# Patient Record
Sex: FEMALE | Race: WHITE
[De-identification: names, ages, dates, MRNs, and addresses within clinical notes are randomized per-mention and may not be internally consistent; named-entity substitution may affect disease eponyms.]

---

## 2017-01-01 ENCOUNTER — PRIOR ORIGINAL RECORDS (OUTPATIENT)
Dept: OTHER | Age: 82
End: 2017-01-01

## 2017-01-01 LAB
ALT (SGPT): 14 U/L
AST (SGOT): 21 U/L
BNP: 400 PMOL/L
BNP: 427 PMOL/L
BUN: 15 MG/DL
BUN: 21 MG/DL
BUN: 29 MG/DL
BUN: 32 MG/DL
CALCIUM: 8.7 MG/DL
CALCIUM: 8.9 MG/DL
CALCIUM: 8.9 MG/DL
CALCIUM: 9.1 MG/DL
CHLORIDE: 103 MEQ/L
CHLORIDE: 103 MEQ/L
CHLORIDE: 105 MEQ/L
CHLORIDE: 106 MEQ/L
CHOLESTEROL, TOTAL: 143 MG/DL
CREATININE, SERUM: 1.17 MG/DL
CREATININE, SERUM: 1.19 MG/DL
CREATININE, SERUM: 1.47 MG/DL
CREATININE, SERUM: 1.87 MG/DL
GLUCOSE: 108 MG/DL
GLUCOSE: 114 MG/DL
GLUCOSE: 115 MG/DL
GLUCOSE: 143 MG/DL
HDL CHOLESTEROL: 50 MG/DL
LDL CHOLESTEROL: 63 MG/DL
MAGNESIUM: 2 MG/DL
MAGNESIUM: 2 MG/DL
NON-HDL CHOLESTEROL: 93 MG/DL
POTASSIUM, SERUM: 3.3 MEQ/L
POTASSIUM, SERUM: 3.5 MEQ/L
POTASSIUM, SERUM: 3.6 MEQ/L
POTASSIUM, SERUM: 4 MEQ/L
SODIUM: 137 MEQ/L
SODIUM: 140 MEQ/L
TRIGLYCERIDES: 149 MG/DL

## 2017-04-19 ENCOUNTER — LAB ENCOUNTER (OUTPATIENT)
Dept: LAB | Age: 82
End: 2017-04-19
Attending: INTERNAL MEDICINE
Payer: MEDICARE

## 2017-04-19 DIAGNOSIS — I50.9 CHF (CONGESTIVE HEART FAILURE) (HCC): ICD-10-CM

## 2017-04-19 DIAGNOSIS — I25.10 CAD (CORONARY ARTERY DISEASE): Primary | ICD-10-CM

## 2017-04-19 PROCEDURE — 36415 COLL VENOUS BLD VENIPUNCTURE: CPT

## 2017-04-19 PROCEDURE — 80061 LIPID PANEL: CPT

## 2017-04-19 PROCEDURE — 84460 ALANINE AMINO (ALT) (SGPT): CPT

## 2017-04-19 PROCEDURE — 84450 TRANSFERASE (AST) (SGOT): CPT

## 2017-07-08 ENCOUNTER — HOSPITAL ENCOUNTER (OUTPATIENT)
Age: 82
Discharge: HOME | End: 2017-07-08
Attending: EMERGENCY MEDICINE
Payer: MEDICARE

## 2017-07-08 VITALS
RESPIRATION RATE: 16 BRPM | DIASTOLIC BLOOD PRESSURE: 57 MMHG | BODY MASS INDEX: 29.99 KG/M2 | WEIGHT: 180 LBS | HEIGHT: 65 IN | HEART RATE: 92 BPM | SYSTOLIC BLOOD PRESSURE: 140 MMHG | TEMPERATURE: 98 F | OXYGEN SATURATION: 95 %

## 2017-07-08 DIAGNOSIS — S61.209A AVULSION, FINGER TIP, INITIAL ENCOUNTER: Primary | ICD-10-CM

## 2017-07-08 PROCEDURE — 99213 OFFICE O/P EST LOW 20 MIN: CPT

## 2017-07-08 PROCEDURE — 12001 RPR S/N/AX/GEN/TRNK 2.5CM/<: CPT

## 2017-09-15 ENCOUNTER — TELEPHONE (OUTPATIENT)
Dept: PULMONOLOGY | Facility: CLINIC | Age: 82
End: 2017-09-15

## 2017-09-15 NOTE — TELEPHONE ENCOUNTER
Spoke with pt she is requesting appt to see [facility], she is not in acute distress.  Pt reports episodes of SOB with activity for a prolonged period, pt declines any immediate attention necessary, pt offered appt 9/21 @ 11 am, pt accepted, location information p

## 2017-09-20 ENCOUNTER — APPOINTMENT (OUTPATIENT)
Dept: GENERAL RADIOLOGY | Facility: HOSPITAL | Age: 82
DRG: 246 | End: 2017-09-20
Attending: EMERGENCY MEDICINE
Payer: MEDICARE

## 2017-09-20 ENCOUNTER — HOSPITAL ENCOUNTER (INPATIENT)
Facility: HOSPITAL | Age: 82
LOS: 11 days | Discharge: HOME HEALTH | DRG: 246 | End: 2017-10-01
Attending: EMERGENCY MEDICINE | Admitting: INTERNAL MEDICINE
Payer: MEDICARE

## 2017-09-20 DIAGNOSIS — I50.20 SYSTOLIC HEART FAILURE, UNSPECIFIED HEART FAILURE CHRONICITY: Primary | ICD-10-CM

## 2017-09-20 PROBLEM — I50.9 HEART FAILURE (HCC): Status: ACTIVE | Noted: 2017-09-20

## 2017-09-20 PROCEDURE — 71010 XR CHEST AP PORTABLE  (CPT=71010): CPT | Performed by: EMERGENCY MEDICINE

## 2017-09-20 PROCEDURE — 99223 1ST HOSP IP/OBS HIGH 75: CPT | Performed by: HOSPITALIST

## 2017-09-20 RX ORDER — PENTOXIFYLLINE 400 MG/1
400 TABLET, EXTENDED RELEASE ORAL 2 TIMES DAILY
COMMUNITY

## 2017-09-20 RX ORDER — ZOLPIDEM TARTRATE 5 MG/1
5 TABLET ORAL NIGHTLY PRN
Status: DISCONTINUED | OUTPATIENT
Start: 2017-09-20 | End: 2017-10-01

## 2017-09-20 RX ORDER — AMLODIPINE BESYLATE 5 MG/1
5 TABLET ORAL NIGHTLY
Status: DISCONTINUED | OUTPATIENT
Start: 2017-09-20 | End: 2017-09-21

## 2017-09-20 RX ORDER — SODIUM PHOSPHATE, DIBASIC AND SODIUM PHOSPHATE, MONOBASIC 7; 19 G/133ML; G/133ML
1 ENEMA RECTAL ONCE AS NEEDED
Status: DISCONTINUED | OUTPATIENT
Start: 2017-09-20 | End: 2017-10-01

## 2017-09-20 RX ORDER — DILTIAZEM HYDROCHLORIDE 5 MG/ML
20 INJECTION INTRAVENOUS ONCE
Status: COMPLETED | OUTPATIENT
Start: 2017-09-20 | End: 2017-09-20

## 2017-09-20 RX ORDER — ERGOCALCIFEROL 1.25 MG/1
50000 CAPSULE ORAL
COMMUNITY

## 2017-09-20 RX ORDER — ASPIRIN 81 MG/1
81 TABLET ORAL DAILY
Status: DISCONTINUED | OUTPATIENT
Start: 2017-09-20 | End: 2017-09-24

## 2017-09-20 RX ORDER — ALBUTEROL SULFATE 90 UG/1
1 AEROSOL, METERED RESPIRATORY (INHALATION) EVERY 4 HOURS PRN
Status: DISCONTINUED | OUTPATIENT
Start: 2017-09-20 | End: 2017-10-01

## 2017-09-20 RX ORDER — ATORVASTATIN CALCIUM 40 MG/1
80 TABLET, FILM COATED ORAL NIGHTLY
Status: DISCONTINUED | OUTPATIENT
Start: 2017-09-20 | End: 2017-10-01

## 2017-09-20 RX ORDER — HYDROCODONE BITARTRATE AND ACETAMINOPHEN 10; 325 MG/1; MG/1
2 TABLET ORAL EVERY 6 HOURS PRN
Status: DISCONTINUED | OUTPATIENT
Start: 2017-09-20 | End: 2017-10-01

## 2017-09-20 RX ORDER — ACETAMINOPHEN 325 MG/1
650 TABLET ORAL EVERY 6 HOURS PRN
Status: DISCONTINUED | OUTPATIENT
Start: 2017-09-20 | End: 2017-10-01

## 2017-09-20 RX ORDER — CLOPIDOGREL BISULFATE 75 MG/1
75 TABLET ORAL NIGHTLY
Status: DISCONTINUED | OUTPATIENT
Start: 2017-09-20 | End: 2017-09-29

## 2017-09-20 RX ORDER — SODIUM CHLORIDE 0.9 % (FLUSH) 0.9 %
3 SYRINGE (ML) INJECTION AS NEEDED
Status: DISCONTINUED | OUTPATIENT
Start: 2017-09-20 | End: 2017-10-01

## 2017-09-20 RX ORDER — HEPARIN SODIUM AND DEXTROSE 10000; 5 [USP'U]/100ML; G/100ML
INJECTION INTRAVENOUS CONTINUOUS
Status: DISCONTINUED | OUTPATIENT
Start: 2017-09-20 | End: 2017-09-23

## 2017-09-20 RX ORDER — DOCUSATE SODIUM 100 MG/1
100 CAPSULE, LIQUID FILLED ORAL 2 TIMES DAILY
Status: DISCONTINUED | OUTPATIENT
Start: 2017-09-20 | End: 2017-10-01

## 2017-09-20 RX ORDER — POLYETHYLENE GLYCOL 3350 17 G/17G
17 POWDER, FOR SOLUTION ORAL DAILY PRN
Status: DISCONTINUED | OUTPATIENT
Start: 2017-09-20 | End: 2017-10-01

## 2017-09-20 RX ORDER — DILTIAZEM HYDROCHLORIDE 60 MG/1
60 TABLET, FILM COATED ORAL AS NEEDED
Status: DISCONTINUED | OUTPATIENT
Start: 2017-09-20 | End: 2017-10-01

## 2017-09-20 RX ORDER — FUROSEMIDE 10 MG/ML
40 INJECTION INTRAMUSCULAR; INTRAVENOUS ONCE
Status: COMPLETED | OUTPATIENT
Start: 2017-09-20 | End: 2017-09-20

## 2017-09-20 RX ORDER — POTASSIUM CHLORIDE 20 MEQ/1
40 TABLET, EXTENDED RELEASE ORAL ONCE
Status: COMPLETED | OUTPATIENT
Start: 2017-09-20 | End: 2017-09-20

## 2017-09-20 RX ORDER — FUROSEMIDE 10 MG/ML
40 INJECTION INTRAMUSCULAR; INTRAVENOUS
Status: DISCONTINUED | OUTPATIENT
Start: 2017-09-20 | End: 2017-09-22

## 2017-09-20 RX ORDER — ONDANSETRON 2 MG/ML
4 INJECTION INTRAMUSCULAR; INTRAVENOUS EVERY 6 HOURS PRN
Status: DISCONTINUED | OUTPATIENT
Start: 2017-09-20 | End: 2017-10-01

## 2017-09-20 RX ORDER — RAMIPRIL 10 MG/1
10 CAPSULE ORAL NIGHTLY
Status: DISCONTINUED | OUTPATIENT
Start: 2017-09-20 | End: 2017-09-21

## 2017-09-20 RX ORDER — BISACODYL 10 MG
10 SUPPOSITORY, RECTAL RECTAL
Status: DISCONTINUED | OUTPATIENT
Start: 2017-09-20 | End: 2017-10-01

## 2017-09-20 RX ORDER — AMLODIPINE BESYLATE 5 MG/1
5 TABLET ORAL NIGHTLY
COMMUNITY
End: 2017-10-01

## 2017-09-20 RX ORDER — HEPARIN SODIUM AND DEXTROSE 10000; 5 [USP'U]/100ML; G/100ML
12 INJECTION INTRAVENOUS ONCE
Status: COMPLETED | OUTPATIENT
Start: 2017-09-20 | End: 2017-09-20

## 2017-09-20 RX ORDER — ALBUTEROL SULFATE 90 UG/1
1 AEROSOL, METERED RESPIRATORY (INHALATION) EVERY 4 HOURS PRN
COMMUNITY

## 2017-09-20 RX ORDER — PENTOXIFYLLINE 400 MG/1
400 TABLET, EXTENDED RELEASE ORAL 2 TIMES DAILY
Status: DISCONTINUED | OUTPATIENT
Start: 2017-09-20 | End: 2017-10-01

## 2017-09-20 NOTE — H&P
[facility]    PATIENT'S NAME: Fidelia Guerra   ATTENDING PHYSICIAN: Ольга Musa MD   PATIENT ACCOUNT#:   [de-identified]    LOCATION:  [facility]  MEDICAL RECORD #:   [id number]       YOB: 1935  ADMISSION DATE:       09/20/2 GENERAL:  Alert and oriented to time, place, and person, in moderate distress. VITAL SIGNS:  Temperature 97.3, pulse 79, respiratory rate 20, blood pressure 116/74. Pulse ox 94% on 2L nasal canal, initially was 78% on room air. HEENT:  Atraumatic.   Or

## 2017-09-20 NOTE — CONSULTS
[facility]    Report of Consultation    [address] [facility] Patient Status:  Inpatient    1935 MRN [id number]   Location [facility] 1SW Attending Nereyda Wallis MD   Hosp Day # 0 PCP Re Bridges     Date of Admission: bypass stents hypertension elevated cholesterol with vascular disease who now presents with progressive shortness of breath for a month. Symptoms probably got bad enough that her daughter made her come to the hospital for evaluation.   She denied chest mackenzie heparin (PORCINE) 33191erynj/250mL infusion INITIAL DOSE 12 Units/kg/hr Intravenous Once   heparin (PORCINE) drip 37648ztpho/250mL infusion CONTINUOUS 200-3,000 Units/hr Intravenous Continuous   diltiazem 100mg/100ml in NaCl (CARDIZEM) premix/add-vantage daily.   ergocalciferol 70098 units Oral Cap Take 50,000 Units by mouth every 7 days. Takes wednesday   AmLODIPine Besylate 5 MG Oral Tab Take 5 mg by mouth nightly. furosemide 20 MG Oral Tab Take 1 tablet (20 mg total) by mouth 3 (three) times a week.  Baby Hack Besylate  5 mg Oral Nightly   • aspirin  81 mg Oral Daily   • atorvastatin  80 mg Oral Nightly   • Clopidogrel Bisulfate  75 mg Oral Nightly   • Metoprolol Succinate ER  75 mg Oral Nightly   • Pentoxifylline ER  400 mg Oral BID   • ramipril  10 mg Oral Nig lateral margin of the inferior most median sternotomy wire. Thank you for allowing me to participate in the care of your patient.     Dafne Green  9/20/2017

## 2017-09-20 NOTE — HISTORICAL OFFICE NOTE
Munira Anna  : 1935  ACCOUNT:  [id number]  [phone]  PCP: Dr. Raffy Wall    TODAY'S DATE: 2017  DICTATED BY:  Ann Small M.D.]    CHIEF COMPLAINT: [Followup of .  CAD, of native vessels, Followup of Bruit, carotid and Followu homemaker. ALLERGIES: Cymbalta - Oral    MEDICATIONS: Selected prescriptions see below    VITAL SIGNS: [B/P - 114/60 , Pulse - 62, Respiration - 20, Weight -  176, Height -   65 , BMI - 29.3 ]    CONS: well developed, well nourished.  WEIGHT: BMI paramet have a followup BMP to make sure that we are not over-diuresing her. She was encouraged to walk as able and call if changes. Consider stress testing in the next 6-12 months.  Cholesterol was checked yesterday and is at goal. Blood pressure is stable. ]    F 2007, there has been no significant change in perfusion images.      REASON FOR TEST: Evaluation of CAD, CABG, Dyslipidemia and Dyspnea    REASON FOR PHARMACOLOGICAL TEST: Unable to exercise to required levels    Comparison Study: 6/1/07    FINDINGS:   Patrica Todd Depression  Symptoms: No Chest Pain  Significant Arrhythmias: Frequent PVCs  Blood Pressure Response: Elevated Resting Blood Pressure  TEST CONDUCTED BY: Emeli Mahajan RN  Stress EKG Reviewed By:ZAINAB;CHRIS Mg [id number]      ZAINAB/bill The atrium was mildly dilated. Right ventricle: The cavity size was normal. Systolic function was normal.  Pulmonic valve: The valve appears to be grossly normal. Doppler: There was no evidence for stenosis. No regurgitation.   Tricuspid valve: Structurall

## 2017-09-20 NOTE — ED PROVIDER NOTES
Patient Seen in: [facility] AND [facility] Emergency Department     History   Patient presents with:  Dyspnea ABDIEL SOB (respiratory)    Stated Complaint: SOB    HPI    [de-identified]year old female with a history of CHF presents with 3-4 weeks progressive dyspnea, worse with Sleep. Tiotropium Bromide-Olodaterol (STIOLTO RESPIMAT) 2.5-2.5 MCG/ACT Inhalation Aero Soln,  Inhale 2 puffs into the lungs 2 (two) times daily.      HYDROcodone-acetaminophen  MG Oral Tab,  Take 2 tablets by mouth every 6 (six) hours as needed for injected. Left conjunctiva is not injected. No scleral icterus. Pupils are equal.   Neck: Normal range of motion and phonation normal. Neck supple. No JVD present. Cardiovascular: An irregularly irregular rhythm present. Tachycardia present.     Pulmonary DIFFERENTIAL WITH PLATELET.   Procedure                               Abnormality         Status                     ---------                               -----------         ------                     CBC W/ DIFFERENTIAL[[id number]]          Abnormal right lateral margin of the inferior most sternotomy     wire. Mild degenerative changes of the thoracic spine are apparent. There     is demineralization. OTHER: Apical lordotic projection.          Dictated by (CST): Noam Modi MD on 9/20/2017 at atorvastatin (LIPITOR) tab 80 mg (not administered)   Clopidogrel Bisulfate (PLAVIX) tab 75 mg (not administered)   HYDROcodone-acetaminophen (NORCO)  MG per tab 2 tablet (not administered)   Metoprolol Succinate ER (Toprol XL) 24 hr tab 75 mg (not those reports. Complicating Factors: The patient already has has Acute heart failure ([address] Acute heart failure, unspecified heart failure type ([address] Troponin level elevated; CAD (coronary artery disease);  Heart failure ([address] and Systolic heart failu Heart failure ([facility] [address] I50.9 9/20/2017 Unknown          Condition upon leaving the department: Stable

## 2017-09-21 ENCOUNTER — APPOINTMENT (OUTPATIENT)
Dept: CV DIAGNOSTICS | Facility: HOSPITAL | Age: 82
DRG: 246 | End: 2017-09-21
Attending: HOSPITALIST
Payer: MEDICARE

## 2017-09-21 PROCEDURE — 99222 1ST HOSP IP/OBS MODERATE 55: CPT | Performed by: INTERNAL MEDICINE

## 2017-09-21 PROCEDURE — 93306 TTE W/DOPPLER COMPLETE: CPT | Performed by: HOSPITALIST

## 2017-09-21 PROCEDURE — 99233 SBSQ HOSP IP/OBS HIGH 50: CPT | Performed by: HOSPITALIST

## 2017-09-21 RX ORDER — RAMIPRIL 5 MG/1
5 CAPSULE ORAL NIGHTLY
Status: DISCONTINUED | OUTPATIENT
Start: 2017-09-21 | End: 2017-09-23

## 2017-09-21 NOTE — PROGRESS NOTES
[facility] AND [facility]  [facility] Cardiology Progress Note    Nilesh Mahoney Patient Status:  Inpatient    1935 MRN [id number]   Location [facility] 3W/SW Attending Ginna Elias MD   Hosp Day # 1 PCP Arminda Donis     [de-identified]year old female 09/21/17 0832  Last data filed at 09/21/17 1395   Gross per 24 hour   Intake              330 ml   Output             1525 ml   Net            -1195 ml       Physical Exam:     General: Alert and oriented x 3. No apparent distress.  No respiratory or consti

## 2017-09-21 NOTE — PLAN OF CARE
Patient/Family Goals    • Patient/Family Long Term Goal Progressing    • Patient/Family Short Term Goal Progressing          + SOB with exertion, does recover with rest. O2 sats 91-94% on 6L. Lungs with bilateral crackles to the bases.  Fluid restriction in

## 2017-09-21 NOTE — CONSULTS
[facility]    Report of Consultation    [address] [facility] Patient Status:  Inpatient    1935 MRN [id number]   Location [facility] 3W/SW Attending Selin Gonzalez MD   Hosp Day # 1 PCP Carin Guzmán     Date of Admis 25 mg 25 mg Oral QID Beta Blocker   ramipril (ALTACE) cap 5 mg 5 mg Oral Nightly   diltiazem 100mg/100ml in NaCl (CARDIZEM) premix/add-vantage 5 mg/hr Intravenous Continuous   heparin (PORCINE) drip 00292xbdpv/250mL infusion CONTINUOUS 200-3,000 Units/hr I Besylate 5 MG Oral Tab Take 5 mg by mouth nightly. furosemide 20 MG Oral Tab Take 1 tablet (20 mg total) by mouth 3 (three) times a week. Mondays, Wednesdays, Fridays (Patient taking differently: Take 20 mg by mouth daily.  Mondays, Wednesdays, Fridays ) deficits  Skin: warm, dry    Results:   Laboratory Data    Lab Results  Component Value Date   WBC 4.9 09/21/2017   HGB 11.9 (L) 09/21/2017   HCT 36.1 09/21/2017    (L) 09/21/2017   CREATSERUM 1.31 09/21/2017   BUN 27 (H) 09/21/2017    09/21/2 follow-up with pulmonary.  -Reviewed vitals, labs and imaging    France Villaseñor DO  Pulmonary [facility]  9/21/2017  1:57 PM

## 2017-09-21 NOTE — PROGRESS NOTES
[place] [facility]    Progress Note    [address] [facility] Patient Status:  Inpatient    1935 MRN [id number]   Location [facility] 3W/SW Attending Princess Tripp MD   Hosp Day # 1 PCP Kenton MYRICK     Subjective:     Cons protocol- pt requiring 6L and dose not wear home o2.   - pt does not show clinical signs of pneumonia, continue to monitor  - follow up xray in am.   -pulmonary consult, pt f/u Dr. Karen Cabral outopatient    Acute on chronic diastolic heart failure, rule out sys 09/20/2017 at 16:27 by DEBRA Ventura MD  9/21/2017      ADDENDUM    I've seen and examined the pt independently, the above note reviewed and revised.   D/w DEBRA Aiken MD

## 2017-09-22 ENCOUNTER — APPOINTMENT (OUTPATIENT)
Dept: GENERAL RADIOLOGY | Facility: HOSPITAL | Age: 82
DRG: 246 | End: 2017-09-22
Attending: NURSE PRACTITIONER
Payer: MEDICARE

## 2017-09-22 PROCEDURE — 99233 SBSQ HOSP IP/OBS HIGH 50: CPT | Performed by: HOSPITALIST

## 2017-09-22 PROCEDURE — 99232 SBSQ HOSP IP/OBS MODERATE 35: CPT | Performed by: INTERNAL MEDICINE

## 2017-09-22 PROCEDURE — 71020 XR CHEST PA + LAT CHEST (CPT=71020): CPT | Performed by: NURSE PRACTITIONER

## 2017-09-22 RX ORDER — POTASSIUM CHLORIDE 20 MEQ/1
40 TABLET, EXTENDED RELEASE ORAL ONCE
Status: COMPLETED | OUTPATIENT
Start: 2017-09-22 | End: 2017-09-22

## 2017-09-22 RX ORDER — METOPROLOL TARTRATE 50 MG/1
50 TABLET, FILM COATED ORAL
Status: DISCONTINUED | OUTPATIENT
Start: 2017-09-22 | End: 2017-09-23

## 2017-09-22 RX ORDER — BUMETANIDE 0.25 MG/ML
1 INJECTION, SOLUTION INTRAMUSCULAR; INTRAVENOUS
Status: DISCONTINUED | OUTPATIENT
Start: 2017-09-22 | End: 2017-09-26

## 2017-09-22 NOTE — PROGRESS NOTES
[place] [facility] - [facility]     Progress Note        [address] [facility] Patient Status:  Inpatient    1935 MRN [id number]   Location [facility] 3W/SW Attending Farzana Hewitt MD   Hosp Day # 2 PCP Lesa MYRICK       Subjective:   P 10 mg Rectal Daily PRN   FLEET ENEMA (FLEET) 7-19 GM/118ML enema 133 mL 1 enema Rectal Once PRN   Albuterol Sulfate  (90 Base) MCG/ACT inhaler 1 puff 1 puff Inhalation Q4H PRN   aspirin EC tab 81 mg 81 mg Oral Daily   atorvastatin (LIPITOR) tab 80 m with evidence of vascular congestion seen. Interstitial edema present.    -Low clinical suspicion for pneumonia in light of lack of fevers, leukocytosis, productive cough with purulent sputum. Recommend holding off antibiotic therapy at this time.     P

## 2017-09-22 NOTE — CM/SW NOTE
Possible need for home O2. Daniel Forde ([phone]) notified, patient will not be discharging today. Still need documented O2 sats and signed order.      Bunny Webb, [facility] Zeb Villasenor 83

## 2017-09-22 NOTE — PLAN OF CARE
Received pt with knowledge that Left antecubital site where an IV had come out kept bleeding off and on throughout the day. Initial shift assessment revealed it had begun bleeding again. Pressure applied for 5 minutes then pressure dressing applied.   No

## 2017-09-22 NOTE — PROGRESS NOTES
[place] [facility] - [facility]    Progress Note    [address] [facility] Patient Status:  Inpatient    1935 MRN [id number]   Location [facility] 3W/SW Attending Bradley Mccarthy MD   Hosp Day # 2 PCP Lisa MYRICK     Subjective:     Cons pulmonary vascular congestion, and moderate interstitial edema  - o2 protocol- pt requiring 6L and dose not wear home o2.   - follow up xray- atelectasis  - IS ordered  - pulmonary following    Acute on chronic diastolic heart failure  Urinates well, but n

## 2017-09-22 NOTE — PROGRESS NOTES
[place] [facility] - [facility]    Progress Note    [address] [facility] Patient Status:  Inpatient    1935 MRN [id number]   Location [facility] 3W/SW Attending Jose Fowler MD   Hosp Day # 2 PCP Jeffory Simmonds S         Assessment and Greg Tartrate  25 mg Oral 2x Daily(Beta Blocker)   • metoprolol Tartrate  50 mg Oral 2x Daily(Beta Blocker)   • DilTIAZem HCl  30 mg Oral 4 times per day   • ramipril  5 mg Oral Nightly   • docusate sodium  100 mg Oral BID   • aspirin  81 mg Oral Daily   • ator

## 2017-09-23 PROCEDURE — 99232 SBSQ HOSP IP/OBS MODERATE 35: CPT | Performed by: INTERNAL MEDICINE

## 2017-09-23 PROCEDURE — 99233 SBSQ HOSP IP/OBS HIGH 50: CPT | Performed by: HOSPITALIST

## 2017-09-23 RX ORDER — METOPROLOL TARTRATE 100 MG/1
100 TABLET ORAL
Status: DISCONTINUED | OUTPATIENT
Start: 2017-09-23 | End: 2017-10-01

## 2017-09-23 RX ORDER — 0.9 % SODIUM CHLORIDE 0.9 %
VIAL (ML) INJECTION
Status: COMPLETED
Start: 2017-09-23 | End: 2017-09-23

## 2017-09-23 RX ORDER — DILTIAZEM HYDROCHLORIDE 120 MG/1
120 CAPSULE, COATED, EXTENDED RELEASE ORAL EVERY EVENING
Status: DISCONTINUED | OUTPATIENT
Start: 2017-09-23 | End: 2017-09-24

## 2017-09-23 RX ORDER — POTASSIUM CHLORIDE 20 MEQ/1
40 TABLET, EXTENDED RELEASE ORAL EVERY 4 HOURS
Status: COMPLETED | OUTPATIENT
Start: 2017-09-23 | End: 2017-09-23

## 2017-09-23 NOTE — PROGRESS NOTES
[facility] - [facility]    Cardiology Progress Note    [place] Patient Status:  Inpatient    1935 MRN [id number]   Location [facility] 3W/SW Attending Namrata Sotelo MD   Hosp Day # 3 PCP Evans Falk 37.2 09/22/2017    09/22/2017   CREATSERUM 1.12 09/23/2017   BUN 27 (H) 09/23/2017    (L) 09/23/2017   K 3.6 09/23/2017    09/23/2017   CO2 24 09/23/2017   GLU 99 09/23/2017   CA 8.4 (L) 09/23/2017   AST 21 04/19/2017   ALT 14 04/19/2017

## 2017-09-23 NOTE — PROGRESS NOTES
[facility]    Progress Note    [address] [facility] Patient Status:  Inpatient    1935 MRN [id number]   Location [facility] 3W/SW Attending Cindy Lara MD   Hosp Day # 3 PCP Reyes Lehmann       Subjective:     Roc Bonilla diuresis     CAD/CABG  - plan Lexiscan Sunday or Monday   - continue Plavix and Asprin      DVT prophylaxis: heparin gtt-->xarelto  Dispo: pending   Greater than 35 minutes spent, >50% spent counseling and coordinating of care as outlined above.     Results

## 2017-09-23 NOTE — PROGRESS NOTES
[place] [facility] - [facility]     Progress Note        [address] [facility] Patient Status:  Inpatient    1935 MRN [id number]   Location [facility] 3W/SW Attending Mariya Palacios MD   Hosp Day # 3 PCP Madeline MYRICK       Subjective:   P Rectal Daily PRN   FLEET ENEMA (FLEET) 7-19 GM/118ML enema 133 mL 1 enema Rectal Once PRN   Albuterol Sulfate  (90 Base) MCG/ACT inhaler 1 puff 1 puff Inhalation Q4H PRN   aspirin EC tab 81 mg 81 mg Oral Daily   atorvastatin (LIPITOR) tab 80 mg 80 m clinical suspicion for pneumonia in light of lack of fevers, leukocytosis, productive cough with purulent sputum. Recommend holding off antibiotic therapy at this time.     Pro-calcitonin negative  -Clinical improvement after diuresis and rate control fo

## 2017-09-24 ENCOUNTER — APPOINTMENT (OUTPATIENT)
Dept: NUCLEAR MEDICINE | Facility: HOSPITAL | Age: 82
DRG: 246 | End: 2017-09-24
Attending: INTERNAL MEDICINE
Payer: MEDICARE

## 2017-09-24 ENCOUNTER — APPOINTMENT (OUTPATIENT)
Dept: CV DIAGNOSTICS | Facility: HOSPITAL | Age: 82
DRG: 246 | End: 2017-09-24
Attending: INTERNAL MEDICINE
Payer: MEDICARE

## 2017-09-24 PROCEDURE — 93017 CV STRESS TEST TRACING ONLY: CPT | Performed by: INTERNAL MEDICINE

## 2017-09-24 PROCEDURE — 99232 SBSQ HOSP IP/OBS MODERATE 35: CPT | Performed by: INTERNAL MEDICINE

## 2017-09-24 PROCEDURE — 99233 SBSQ HOSP IP/OBS HIGH 50: CPT | Performed by: HOSPITALIST

## 2017-09-24 PROCEDURE — 78452 HT MUSCLE IMAGE SPECT MULT: CPT | Performed by: INTERNAL MEDICINE

## 2017-09-24 RX ORDER — SODIUM CHLORIDE 9 MG/ML
INJECTION, SOLUTION INTRAVENOUS CONTINUOUS
Status: DISCONTINUED | OUTPATIENT
Start: 2017-09-24 | End: 2017-09-25

## 2017-09-24 RX ORDER — CHLORHEXIDINE GLUCONATE 4 G/100ML
30 SOLUTION TOPICAL
Status: COMPLETED | OUTPATIENT
Start: 2017-09-25 | End: 2017-09-25

## 2017-09-24 RX ORDER — IPRATROPIUM BROMIDE AND ALBUTEROL SULFATE 2.5; .5 MG/3ML; MG/3ML
3 SOLUTION RESPIRATORY (INHALATION) EVERY 6 HOURS PRN
Status: DISCONTINUED | OUTPATIENT
Start: 2017-09-24 | End: 2017-10-01

## 2017-09-24 RX ORDER — DILTIAZEM HYDROCHLORIDE 180 MG/1
180 CAPSULE, COATED, EXTENDED RELEASE ORAL EVERY EVENING
Status: DISCONTINUED | OUTPATIENT
Start: 2017-09-24 | End: 2017-09-30

## 2017-09-24 RX ORDER — ASPIRIN 81 MG/1
81 TABLET ORAL DAILY
Status: DISCONTINUED | OUTPATIENT
Start: 2017-09-26 | End: 2017-09-29

## 2017-09-24 RX ORDER — 0.9 % SODIUM CHLORIDE 0.9 %
VIAL (ML) INJECTION
Status: COMPLETED
Start: 2017-09-24 | End: 2017-09-24

## 2017-09-24 RX ORDER — ASPIRIN 81 MG/1
324 TABLET, CHEWABLE ORAL ONCE
Status: COMPLETED | OUTPATIENT
Start: 2017-09-25 | End: 2017-09-25

## 2017-09-24 NOTE — PROGRESS NOTES
[place] [facility] - [facility]    Progress Note    [address] [facility] Patient Status:  Inpatient    1935 MRN [id number]   Location [facility] 3W/SW Attending Princess Tripp MD   Hosp Day # 4 PCP Akash Gutiérrez       Subjective:     Roderick Platt facilitate diuresis     CAD/CABG  - s/p Lexiscan today +abn with moderate reversible perfusion defect inferior wall  - d/w cardiology  - continue Plavix and Asprin      DVT prophylaxis: heparin gtt-->xarelto  Dispo: pending   Greater than 35 minutes spent,

## 2017-09-24 NOTE — PROGRESS NOTES
[facility] Card[facility]  Progress Note    [address] [facility] Patient Status:  Inpatient    1935 MRN [id number]   Location [facility] 3W/SW Attending Bradley Mccarthy MD   Hosp Day # 4 PCP Alison Cronin therapy now.     Results:     Lab Results  Component Value Date   WBC 5.2 09/22/2017   HGB 12.1 09/22/2017   HCT 37.2 09/22/2017    09/22/2017   CREATSERUM 1.19 09/24/2017   BUN 23 (H) 09/24/2017    09/24/2017   K 4.2 09/24/2017    09/24/

## 2017-09-24 NOTE — PLAN OF CARE
CARDIOVASCULAR - ADULT    • Maintains optimal cardiac output and hemodynamic stability Progressing    No complaints of chest pain, She is AFIB on the monitor . Plan is stress test in am .

## 2017-09-24 NOTE — PLAN OF CARE
RESPIRATORY - ADULT    • Achieves optimal ventilation and oxygenation Not Progressing          CARDIOVASCULAR - ADULT    • Maintains optimal cardiac output and hemodynamic stability Progressing    • Absence of cardiac arrhythmias or at baseline Progressing

## 2017-09-24 NOTE — PROGRESS NOTES
[place] [facility] - [facility]    Progress Note    [address] [facility] Patient Status:  Inpatient    1935 MRN [id number]   Location [facility] 3W/SW Attending Tejas Sun MD   Hosp Day # 4 PCP Otto Toribio     Subjective:   Marcy Ramirez

## 2017-09-25 ENCOUNTER — APPOINTMENT (OUTPATIENT)
Dept: INTERVENTIONAL RADIOLOGY/VASCULAR | Facility: HOSPITAL | Age: 82
DRG: 246 | End: 2017-09-25
Attending: INTERNAL MEDICINE
Payer: MEDICARE

## 2017-09-25 PROCEDURE — B4101ZZ FLUOROSCOPY OF ABDOMINAL AORTA USING LOW OSMOLAR CONTRAST: ICD-10-PCS | Performed by: INTERNAL MEDICINE

## 2017-09-25 PROCEDURE — B41F1ZZ FLUOROSCOPY OF RIGHT LOWER EXTREMITY ARTERIES USING LOW OSMOLAR CONTRAST: ICD-10-PCS | Performed by: INTERNAL MEDICINE

## 2017-09-25 PROCEDURE — 99233 SBSQ HOSP IP/OBS HIGH 50: CPT | Performed by: HOSPITALIST

## 2017-09-25 PROCEDURE — B2101ZZ FLUOROSCOPY OF SINGLE CORONARY ARTERY USING LOW OSMOLAR CONTRAST: ICD-10-PCS | Performed by: INTERNAL MEDICINE

## 2017-09-25 PROCEDURE — 4A023N8 MEASUREMENT OF CARDIAC SAMPLING AND PRESSURE, BILATERAL, PERCUTANEOUS APPROACH: ICD-10-PCS | Performed by: INTERNAL MEDICINE

## 2017-09-25 PROCEDURE — 99233 SBSQ HOSP IP/OBS HIGH 50: CPT | Performed by: INTERNAL MEDICINE

## 2017-09-25 RX ORDER — MIDAZOLAM HYDROCHLORIDE 1 MG/ML
INJECTION INTRAMUSCULAR; INTRAVENOUS
Status: COMPLETED
Start: 2017-09-25 | End: 2017-09-25

## 2017-09-25 RX ORDER — LIDOCAINE HYDROCHLORIDE 20 MG/ML
INJECTION, SOLUTION EPIDURAL; INFILTRATION; INTRACAUDAL; PERINEURAL
Status: COMPLETED
Start: 2017-09-25 | End: 2017-09-25

## 2017-09-25 RX ORDER — SODIUM CHLORIDE 9 MG/ML
INJECTION, SOLUTION INTRAVENOUS CONTINUOUS
Status: ACTIVE | OUTPATIENT
Start: 2017-09-25 | End: 2017-09-25

## 2017-09-25 NOTE — PRE-SEDATION ASSESSMENT
Pre-Procedure Sedation Assessment    Chief Complaint/Indication for procedure: Unstable angina/abnormal stress test    History of snoring or sleep or apnea?    No    History of previous problems with anesthesia or sedation  No    Physical Findings:  Neck: n

## 2017-09-25 NOTE — BRIEF PROCEDURE NOTE
[facility]    Brief Cardiac Cath Note  Matty Forward Patient Status:  Inpatient    1935 MRN [id number]   Location [facility] Attending Anca Wilkerson MD   Hosp Day # 5 PCP Jeronimo Goodell

## 2017-09-25 NOTE — PROGRESS NOTES
[facility]    Progress Note    [address] [facility] Patient Status:  Inpatient    1935 MRN [id number]   Location [facility] 3W/SW Attending Selin Gonzalez MD   Hosp Day # 5 PCP Carin Guzmán       Subjective:     Paulina Flowers Plavix and Asprin   - plan cath 9/25     DVT prophylaxis: heparin gtt-->xarelto- hold for cath  Dispo: pending    Results:       Lab Results  Component Value Date   WBC 5.2 09/22/2017   HGB 12.1 09/22/2017   HCT 37.2 09/22/2017    09/22/2017   CREAT

## 2017-09-25 NOTE — PROGRESS NOTES
Pulmonary/Critical Care Follow Up Note    HPI:   Dhaval Brady is a [de-identified]year old female with Patient presents with:  Dyspnea ABDIEL SOB (respiratory)      PCP Eamon Lieu  Admission Attending [place] 15 Day #5    No sob  No cou Bisulfate (PLAVIX) tab 75 mg, 75 mg, Oral, Nightly  •  HYDROcodone-acetaminophen (NORCO)  MG per tab 2 tablet, 2 tablet, Oral, Q6H PRN  •  Pentoxifylline ER (TRENTAL) CR tab 400 mg, 400 mg, Oral, BID  •  Zolpidem Tartrate (AMBIEN) tab 5 mg, 5 mg, Delvin Sayres

## 2017-09-26 PROCEDURE — 99232 SBSQ HOSP IP/OBS MODERATE 35: CPT | Performed by: INTERNAL MEDICINE

## 2017-09-26 PROCEDURE — 99232 SBSQ HOSP IP/OBS MODERATE 35: CPT | Performed by: HOSPITALIST

## 2017-09-26 RX ORDER — HEPARIN SODIUM 1000 [USP'U]/ML
60 INJECTION, SOLUTION INTRAVENOUS; SUBCUTANEOUS ONCE
Status: COMPLETED | OUTPATIENT
Start: 2017-09-26 | End: 2017-09-26

## 2017-09-26 RX ORDER — FUROSEMIDE 40 MG/1
40 TABLET ORAL DAILY
Status: DISCONTINUED | OUTPATIENT
Start: 2017-09-26 | End: 2017-10-01

## 2017-09-26 RX ORDER — HEPARIN SODIUM AND DEXTROSE 10000; 5 [USP'U]/100ML; G/100ML
INJECTION INTRAVENOUS CONTINUOUS
Status: DISCONTINUED | OUTPATIENT
Start: 2017-09-26 | End: 2017-09-30

## 2017-09-26 RX ORDER — HEPARIN SODIUM AND DEXTROSE 10000; 5 [USP'U]/100ML; G/100ML
12 INJECTION INTRAVENOUS ONCE
Status: DISCONTINUED | OUTPATIENT
Start: 2017-09-26 | End: 2017-09-29

## 2017-09-26 NOTE — PROGRESS NOTES
[place] [facility] - [facility]     Progress Note        [address] [facility] Patient Status:  Inpatient    1935 MRN [id number]   Location [facility] 3W/SW Attending Gerardo Arguello MD   Hosp Day # 6 PCP Ezequiel MYRICK       Subjective:   P Daily PRN   FLEET ENEMA (FLEET) 7-19 GM/118ML enema 133 mL 1 enema Rectal Once PRN   Albuterol Sulfate  (90 Base) MCG/ACT inhaler 1 puff 1 puff Inhalation Q4H PRN   atorvastatin (LIPITOR) tab 80 mg 80 mg Oral Nightly   Clopidogrel Bisulfate (PLAVIX) suspicion for pneumonia in light of lack of fevers, leukocytosis, productive cough with purulent sputum. Recommend holding off antibiotic therapy at this time.     Pro-calcitonin negative  -Clinical improvement after diuresis and rate control for atrial

## 2017-09-26 NOTE — PLAN OF CARE
CARDIOVASCULAR - ADULT    • Maintains optimal cardiac output and hemodynamic stability Progressing    • Absence of cardiac arrhythmias or at baseline Progressing        Patient/Family Goals    • Patient/Family Long Term Goal- go home Progressing    • Patie

## 2017-09-26 NOTE — PROGRESS NOTES
[place] [facility] - [facility]    Progress Note    [address] [facility] Patient Status:  Inpatient    1935 MRN [id number]   Location [facility] 3W/SW Attending Marjan Benitez MD   Hosp Day # 6 PCP Steph MYRICK         Assessment and Greg Tartrate  100 mg Oral 2x Daily(Beta Blocker)   • docusate sodium  100 mg Oral BID   • atorvastatin  80 mg Oral Nightly   • Clopidogrel Bisulfate  75 mg Oral Nightly   • Pentoxifylline ER  400 mg Oral BID   • Tiotropium Bromide-Olodaterol  2 puff Inhalation

## 2017-09-26 NOTE — PROGRESS NOTES
[place] [facility] - [facility]    Progress Note    [address] [facility] Patient Status:  Inpatient    1935 MRN [id number]   Location [facility] 3W/SW Attending Justice Rubinstein, MD   Hosp Day # 6 PCP Gold Chamberlain       Subjective:     Pam Martinez perfusion defect inferior wall  - d/w cardiology  - continue Plavix and Asprin   - s/p cath 9/25, will need PCI     DVT prophylaxis: heparin gtt-->xarelto- hold for cath  Dispo: pending    Results:       Lab Results  Component Value Date   WBC 5.2 09/22/20

## 2017-09-26 NOTE — CM/SW NOTE
9/26/17 CM Discharge planning   Met with pt,resides alone, on first level of family home, pt reports independent with ADL's and ambulation prior to admission. Discharge planning discussed, and [place] [phone] options provided.   Pt agreed to [place] [address] services from Resident

## 2017-09-27 PROCEDURE — 99232 SBSQ HOSP IP/OBS MODERATE 35: CPT | Performed by: INTERNAL MEDICINE

## 2017-09-27 PROCEDURE — 99233 SBSQ HOSP IP/OBS HIGH 50: CPT | Performed by: HOSPITALIST

## 2017-09-27 NOTE — PROGRESS NOTES
[place] [facility] - [facility]    Progress Note    [address] [facility] Patient Status:  Inpatient    1935 MRN [id number]   Location [facility] 3W/SW Attending Marquise Weiner MD   Hosp Day # 7 PCP Thanh Morales         Assessment and Plan: 80 mg Oral Nightly   • Clopidogrel Bisulfate  75 mg Oral Nightly   • Pentoxifylline ER  400 mg Oral BID   • Tiotropium Bromide-Olodaterol  2 puff Inhalation Daily             Results:     Lab Results  Component Value Date   WBC 5.6 09/27/2017   HGB 12.8 09

## 2017-09-27 NOTE — PROGRESS NOTES
[place] [facility] - [facility]    Progress Note    [address] [facility] Patient Status:  Inpatient    1935 MRN [id number]   Location [facility] 3W/SW Attending Anca Wilkerson MD   [facility] Day # 7 PCP Jeronimo Goodell       Subjective:     Cecilia Part low side     CAD/CABG  - s/p Lexiscan +abn with moderate reversible perfusion defect inferior wall  - d/w cardiology  - continue Plavix and Asprin   - s/p cath 9/25, will need PCI on Friday     DVT prophylaxis: heparin gtt-->xarelto- hold for cath  Dispo:

## 2017-09-27 NOTE — PLAN OF CARE
RESPIRATORY - ADULT    • Achieves optimal ventilation and oxygenation Not Progressing          Unable to wean oxygen. Patient's O2 sats 89-90% on 3-5L. Increased O2 to 6L HFNC. Denies sob/dyspnea.      CARDIOVASCULAR - ADULT    • Maintains optimal cardiac o

## 2017-09-27 NOTE — PLAN OF CARE
CARDIOVASCULAR - ADULT    • Maintains optimal cardiac output and hemodynamic stability Progressing    • Absence of cardiac arrhythmias or at baseline Progressing    Plan for cath on Friday, continue to monitor     Patient/Family Goals    • Patient/Family L

## 2017-09-28 PROCEDURE — 99232 SBSQ HOSP IP/OBS MODERATE 35: CPT | Performed by: HOSPITALIST

## 2017-09-28 PROCEDURE — 99232 SBSQ HOSP IP/OBS MODERATE 35: CPT | Performed by: INTERNAL MEDICINE

## 2017-09-28 RX ORDER — ASPIRIN 81 MG/1
324 TABLET, CHEWABLE ORAL ONCE
Status: COMPLETED | OUTPATIENT
Start: 2017-09-29 | End: 2017-09-29

## 2017-09-28 RX ORDER — POTASSIUM CHLORIDE 20 MEQ/1
40 TABLET, EXTENDED RELEASE ORAL ONCE
Status: COMPLETED | OUTPATIENT
Start: 2017-09-28 | End: 2017-09-28

## 2017-09-28 RX ORDER — SODIUM CHLORIDE 9 MG/ML
INJECTION, SOLUTION INTRAVENOUS CONTINUOUS
Status: DISCONTINUED | OUTPATIENT
Start: 2017-09-29 | End: 2017-09-30

## 2017-09-28 RX ORDER — CHLORHEXIDINE GLUCONATE 4 G/100ML
30 SOLUTION TOPICAL
Status: COMPLETED | OUTPATIENT
Start: 2017-09-29 | End: 2017-09-29

## 2017-09-28 NOTE — PROGRESS NOTES
Pulmonary/Critical Care Follow Up Note    HPI:   Petar Locke is a [de-identified]year old female with Patient presents with:  Dyspnea ABDIEL SOB (respiratory)      PCP Carin Guzmán  Admission Attending [facility] [place] 15 Day #8    No sob  No cou Once PRN  •  Albuterol Sulfate  (90 Base) MCG/ACT inhaler 1 puff, 1 puff, Inhalation, Q4H PRN  •  atorvastatin (LIPITOR) tab 80 mg, 80 mg, Oral, Nightly  •  Clopidogrel Bisulfate (PLAVIX) tab 75 mg, 75 mg, Oral, Nightly  •  HYDROcodone-acetaminophen

## 2017-09-28 NOTE — PROGRESS NOTES
[place] [facility] - [facility]    Progress Note    [address] [facility] Patient Status:  Inpatient    1935 MRN [id number]   Location [facility] 3W/SW Attending Sheila Montiel MD   [address] Day # 7 PCP Ishmael MYRICK     Subjective:   Subjective:

## 2017-09-28 NOTE — PROGRESS NOTES
[place] [facility] - [facility]    Progress Note    [address] [facility] Patient Status:  Inpatient    1935 MRN [id number]   Location [facility] 3W/SW Attending Gerardo Arguello MD   Hosp Day # 8 PCP Ezequiel MYRICK       Subjective:     No c reversible perfusion defect inferior wall  - d/w cardiology  - continue Plavix and Asprin   - s/p cath 9/25, will need PCI on Friday     DVT prophylaxis: heparin gtt  Dispo: pending    Results:       Lab Results  Component Value Date   WBC 5.6 09/27/2017

## 2017-09-28 NOTE — PROGRESS NOTES
Plan is PCI tomorrow with significant LAD stenosis, overview of procedure as well as risks including stroke, death, heart attack, hematoma, bleeding, allergic reaction, and kidney failure discussed with patient and patient has no questions, orders placed a

## 2017-09-28 NOTE — PLAN OF CARE
CARDIOVASCULAR - ADULT    • Maintains optimal cardiac output and hemodynamic stability Progressing    • Absence of cardiac arrhythmias or at baseline Progressing        Patient/Family Goals    • Patient/Family Long Term Goal-go home  Progressing    • Patie

## 2017-09-28 NOTE — PROGRESS NOTES
[facility]    Progress Note    [address] [facility] Patient Status:  Inpatient    1935 MRN [id number]   Location [facility] 3W/SW Attending Carlyle Bro MD   Hosp Day # 8 PCP El MYRICK         Assessment and Plan:   H BID   • atorvastatin  80 mg Oral Nightly   • Clopidogrel Bisulfate  75 mg Oral Nightly   • Pentoxifylline ER  400 mg Oral BID   • Tiotropium Bromide-Olodaterol  2 puff Inhalation Daily             Results:     Lab Results  Component Value Date   WBC 5.6 09

## 2017-09-29 ENCOUNTER — APPOINTMENT (OUTPATIENT)
Dept: INTERVENTIONAL RADIOLOGY/VASCULAR | Facility: HOSPITAL | Age: 82
DRG: 246 | End: 2017-09-29
Attending: NURSE PRACTITIONER
Payer: MEDICARE

## 2017-09-29 PROCEDURE — 4A023N7 MEASUREMENT OF CARDIAC SAMPLING AND PRESSURE, LEFT HEART, PERCUTANEOUS APPROACH: ICD-10-PCS | Performed by: INTERNAL MEDICINE

## 2017-09-29 PROCEDURE — 0270346 DILATION OF CORONARY ARTERY, ONE ARTERY, BIFURCATION, WITH DRUG-ELUTING INTRALUMINAL DEVICE, PERCUTANEOUS APPROACH: ICD-10-PCS | Performed by: INTERNAL MEDICINE

## 2017-09-29 PROCEDURE — 02C03ZZ EXTIRPATION OF MATTER FROM CORONARY ARTERY, ONE ARTERY, PERCUTANEOUS APPROACH: ICD-10-PCS | Performed by: INTERNAL MEDICINE

## 2017-09-29 PROCEDURE — B2121ZZ FLUOROSCOPY OF SINGLE CORONARY ARTERY BYPASS GRAFT USING LOW OSMOLAR CONTRAST: ICD-10-PCS | Performed by: INTERNAL MEDICINE

## 2017-09-29 PROCEDURE — 99232 SBSQ HOSP IP/OBS MODERATE 35: CPT | Performed by: HOSPITALIST

## 2017-09-29 PROCEDURE — 99232 SBSQ HOSP IP/OBS MODERATE 35: CPT | Performed by: INTERNAL MEDICINE

## 2017-09-29 PROCEDURE — B2111ZZ FLUOROSCOPY OF MULTIPLE CORONARY ARTERIES USING LOW OSMOLAR CONTRAST: ICD-10-PCS | Performed by: INTERNAL MEDICINE

## 2017-09-29 PROCEDURE — B2181ZZ FLUOROSCOPY OF LEFT INTERNAL MAMMARY BYPASS GRAFT USING LOW OSMOLAR CONTRAST: ICD-10-PCS | Performed by: INTERNAL MEDICINE

## 2017-09-29 RX ORDER — CLOPIDOGREL BISULFATE 75 MG/1
75 TABLET ORAL DAILY
Status: DISCONTINUED | OUTPATIENT
Start: 2017-09-30 | End: 2017-10-01

## 2017-09-29 RX ORDER — NITROGLYCERIN 20 MG/100ML
INJECTION INTRAVENOUS
Status: DISCONTINUED
Start: 2017-09-29 | End: 2017-09-29 | Stop reason: WASHOUT

## 2017-09-29 RX ORDER — SODIUM CHLORIDE 9 MG/ML
INJECTION, SOLUTION INTRAVENOUS
Status: COMPLETED
Start: 2017-09-29 | End: 2017-09-29

## 2017-09-29 RX ORDER — SODIUM CHLORIDE 9 MG/ML
INJECTION, SOLUTION INTRAVENOUS CONTINUOUS
Status: ACTIVE | OUTPATIENT
Start: 2017-09-29 | End: 2017-09-29

## 2017-09-29 RX ORDER — ASPIRIN 81 MG/1
81 TABLET ORAL DAILY
Status: DISCONTINUED | OUTPATIENT
Start: 2017-09-30 | End: 2017-10-01

## 2017-09-29 RX ORDER — CLOPIDOGREL BISULFATE 75 MG/1
TABLET ORAL
Status: COMPLETED
Start: 2017-09-29 | End: 2017-09-29

## 2017-09-29 RX ORDER — HEPARIN SODIUM 1000 [USP'U]/ML
INJECTION, SOLUTION INTRAVENOUS; SUBCUTANEOUS
Status: COMPLETED
Start: 2017-09-29 | End: 2017-09-29

## 2017-09-29 RX ORDER — LIDOCAINE HYDROCHLORIDE 20 MG/ML
INJECTION, SOLUTION EPIDURAL; INFILTRATION; INTRACAUDAL; PERINEURAL
Status: COMPLETED
Start: 2017-09-29 | End: 2017-09-29

## 2017-09-29 RX ORDER — MIDAZOLAM HYDROCHLORIDE 1 MG/ML
INJECTION INTRAMUSCULAR; INTRAVENOUS
Status: COMPLETED
Start: 2017-09-29 | End: 2017-09-29

## 2017-09-29 NOTE — PROGRESS NOTES
[facility]    Progress Note    [address] [facility] Patient Status:  Inpatient    1935 MRN [id number]   Location [facility] 3W/SW Attending Power Barton MD   [facility] Day # 9 PCP Brandie Cuellar       Subjective:     No c low side     CAD/CABG  - s/p Lexiscan +abn with moderate reversible perfusion defect inferior wall  - d/w cardiology  - continue Plavix and Asprin   - s/p cath 9/25, will need PCI on Friday     DVT prophylaxis: heparin gtt  Dispo: pending, cath today, Jonelle Lennox

## 2017-09-29 NOTE — PROGRESS NOTES
Pulmonary/Critical Care Follow Up Note    HPI:   Elvira Pica is a [de-identified]year old female with Patient presents with:  Dyspnea ABDIEL SOB (respiratory)      PCP Re Bridges  Admission Attending [place] 15 Day #9    No sob  No cou 133 mL, 1 enema, Rectal, Once PRN  •  Albuterol Sulfate  (90 Base) MCG/ACT inhaler 1 puff, 1 puff, Inhalation, Q4H PRN  •  atorvastatin (LIPITOR) tab 80 mg, 80 mg, Oral, Nightly  •  Clopidogrel Bisulfate (PLAVIX) tab 75 mg, 75 mg, Oral, Nightly  • clinic              Becky Greenwood MD

## 2017-09-29 NOTE — CM/SW NOTE
9/29/17 CM Discharge planning / MDO for [address] [facility]   A referral has been made to [place] [address] and [facility] for Home O2. Pt to have cardiac cath today, anticipate d/c home this weekend.    Sukumar Coleman X [id number]

## 2017-09-29 NOTE — PRE-SEDATION ASSESSMENT
Pre-Procedure Sedation Assessment    Chief Complaint/Indication for procedure: Unstable angina,     History of snoring or sleep or apnea?    No    History of previous problems with anesthesia or sedation  No    Physical Findings:  Neck: nl ROM  CV: S1-S2 pr

## 2017-09-29 NOTE — BRIEF PROCEDURE NOTE
[facility]    Brief Cardiac Cath Note  Ami Spotted Patient Status:  Inpatient    1935 MRN [id number]   Location [facility] Attending Royce Flores MD   [address] Day # 9 PCP Shonna Ruiz

## 2017-09-30 PROCEDURE — 99232 SBSQ HOSP IP/OBS MODERATE 35: CPT | Performed by: HOSPITALIST

## 2017-09-30 RX ORDER — FUROSEMIDE 40 MG/1
40 TABLET ORAL DAILY
Qty: 30 TABLET | Refills: 0 | Status: SHIPPED | OUTPATIENT
Start: 2017-09-30

## 2017-09-30 RX ORDER — DILTIAZEM HYDROCHLORIDE 120 MG/1
120 CAPSULE, COATED, EXTENDED RELEASE ORAL EVERY EVENING
Qty: 30 CAPSULE | Refills: 0 | Status: SHIPPED | OUTPATIENT
Start: 2017-09-30

## 2017-09-30 RX ORDER — IPRATROPIUM BROMIDE AND ALBUTEROL SULFATE 2.5; .5 MG/3ML; MG/3ML
3 SOLUTION RESPIRATORY (INHALATION) EVERY 6 HOURS PRN
Qty: 30 VIAL | Refills: 0 | Status: SHIPPED | OUTPATIENT
Start: 2017-09-30

## 2017-09-30 RX ORDER — METOPROLOL TARTRATE 100 MG/1
100 TABLET ORAL
Qty: 60 TABLET | Refills: 0 | Status: SHIPPED | OUTPATIENT
Start: 2017-09-30

## 2017-09-30 RX ORDER — DILTIAZEM HYDROCHLORIDE 120 MG/1
120 CAPSULE, COATED, EXTENDED RELEASE ORAL EVERY EVENING
Status: DISCONTINUED | OUTPATIENT
Start: 2017-09-30 | End: 2017-10-01

## 2017-09-30 NOTE — CM/SW NOTE
MANDIE following up on d/c planning for the patient. Per report, d/c today with home oxygen and [facility]. Cardiology however would like her to remain for 1 more day. MANDIE will arrange discharge tomorrow.   RN aware that the oxygen saturations will be ne

## 2017-09-30 NOTE — PROGRESS NOTES
[facility]  Cardiology Progress Note    Evelin Egan Patient Status:  Inpatient    1935 MRN [id number]   Location [facility] 3W/SW Attending James Becerra MD   Hosp Day # 10 PCP Ezequiel MYRICK       Subjective: Doing well, d 135*  136   K  3.8  4.2  4.2  4.4   CL  100  100  105   CO2  26  26  24   BUN  25*  25*  24*   CREATSERUM  1.11  1.19  1.17   CA  8.6  9.2  8.9   MG   --   2.4   --    GLU  145*  104*  93       No results for input(s): ALT, AST, ALB, AMYLASE, LIPASE, LDH i Daily         Assessment:  Severe coronary artery disease status post rotational arthrectomy and stent to the LAD  Atrial fibrillation with controlled response  LV dysfunction  COPD    Plan:  Restart Xarelto today and continue aspirin 81, Plavix 75 and Xar

## 2017-09-30 NOTE — PLAN OF CARE
Problem: Patient/Family Goals  Goal: Patient/Family Long Term Goal  Patient's Long Term Goal: return home    Interventions:  - medication as per orders  Monitor VS, labs  Monitor activity tolerance  - See additional Care Plan goals for specific interventio

## 2017-09-30 NOTE — PROGRESS NOTES
[facility]    Progress Note    [address] [facility] Patient Status:  Inpatient    1935 MRN [id number]   Location [facility] 3W/SW Attending Manjula Barkley MD   Hosp Day # 10 PCP Anup Flores       Subjective:     Fee with cards    Results:       Lab Results  Component Value Date   WBC 4.6 09/30/2017   HGB 12.6 09/30/2017   HCT 37.9 09/30/2017    09/30/2017   CREATSERUM 1.17 09/30/2017   BUN 24 (H) 09/30/2017    09/30/2017   K 4.4 09/30/2017    09/30/

## 2017-10-01 VITALS
OXYGEN SATURATION: 92 % | RESPIRATION RATE: 18 BRPM | SYSTOLIC BLOOD PRESSURE: 128 MMHG | WEIGHT: 174.19 LBS | HEIGHT: 66 IN | HEART RATE: 87 BPM | BODY MASS INDEX: 27.99 KG/M2 | DIASTOLIC BLOOD PRESSURE: 59 MMHG | TEMPERATURE: 98 F

## 2017-10-01 PROCEDURE — 99239 HOSP IP/OBS DSCHRG MGMT >30: CPT | Performed by: HOSPITALIST

## 2017-10-01 NOTE — CM/SW NOTE
MANDIE following up on d/c planning for the patient. Per RN, possible d/c today to home with [facility] and home oxygen. SW has the face to face, but oxygen saturations are required for SW to order the home o2- RN aware.   [facility] aware as w

## 2017-10-01 NOTE — PLAN OF CARE
O2 saturation at rest on room air is 88% and 84% while ambulating. O2 saturation on 2L at rest is 96% and while ambulating is 90%.

## 2017-10-01 NOTE — PROGRESS NOTES
[facility]    Progress Note    [address] [facility] Patient Status:  Inpatient    1935 MRN [id number]   Location [facility] 3W/SW Attending Noé Ahn MD   [facility] Day # 11 PCP Luiz Ramos       Subjective:     Fee WBC 4.6 09/30/2017   HGB 12.6 09/30/2017   HCT 37.9 09/30/2017    09/30/2017   CREATSERUM 1.17 09/30/2017   BUN 24 (H) 09/30/2017    09/30/2017   K 4.4 09/30/2017    09/30/2017   CO2 24 09/30/2017   GLU 93 09/30/2017   CA 8.9 09/30/201

## 2017-10-01 NOTE — PROGRESS NOTES
[facility]    Progress Note    [address] [facility] Patient Status:  Inpatient    1935 MRN [id number]   Location [facility] 3W/SW Attending Talya Mccarthy MD   [facility] Day # 11 PCP Kelsi Guerra       Subjective:   Henry Hargrove and oriented x 3. No apparent distress. No respiratory or constitutional distress. Neurologic: Alert and oriented, normal affect. No motor or coordinational deficit. Skin: Warm and dry. Neck: No JVD, carotids 2+, no bruits. Cardiac: RRR.  S1, S2 ankita

## 2017-10-02 ENCOUNTER — TELEPHONE (OUTPATIENT)
Dept: PULMONOLOGY | Facility: CLINIC | Age: 82
End: 2017-10-02

## 2017-10-02 ENCOUNTER — TELEPHONE (OUTPATIENT)
Dept: CARDIOLOGY UNIT | Facility: HOSPITAL | Age: 82
End: 2017-10-02

## 2017-10-02 NOTE — TELEPHONE ENCOUNTER
Pts daughter states that pt was released yesterday from [facility] and told to follow up in one month. No appts available. Please call.

## 2017-10-02 NOTE — DISCHARGE SUMMARY
[facility] HOSPITALIST  DISCHARGE SUMMARY     [address] [facility] Patient Status:  Inpatient    1935 MRN [id number]   Location [facility] 3W/SW Attending No att. providers found   [facility] Day # 11 PCP Evette Calix OF ADMISSION: / pulses. No gallop, rub, murmur. Pulm: Effort and breath sounds normal. No distress, wheezes, rales, rhonchi. Abd: Soft, NTND, BS normal, no mass, no HSM, no rebound/guarding. Neuro: Normal reflexes, CN. Sensory/motor exams grossly normal deficit.  Coord mouth nightly as needed for Sleep. Refills:  0     aspirin 81 MG Tbec      Take 1 tablet (81 mg total) by mouth daily. Quantity:  30 tablet  Refills:  0     atorvastatin 80 MG Tabs  Commonly known as:  LIPITOR      Take 80 mg by mouth nightly.    Refill Samira Mills MD Consulting Physician  Cardiovascular Diseases    [facility], [address] [facility] Physician  Internal Medicine          FOLLOW UP:  Thong Tripp, [address]  [phone]    In 1 month      Sinai Lugo

## 2017-10-02 NOTE — TELEPHONE ENCOUNTER
Pt is Dr. Brad Donis pt. She stts she wound up in the hospital on the day she had f/u appt w/ Dr. Karen Cabral. Pt stts she feels pretty good and she is getting around alright & getting used to having O2. She is to f/u in 1 mo per discharge summary.  Per pt she was d

## 2017-10-03 NOTE — ED PROVIDER NOTES
Patient Seen in: [facility] Immediate [facility] In [address] [place] [address]    History   Patient presents with:  Laceration Abrasion (integumentary)    Stated Complaint: finger lac    HPI    The patient is an 82-year-old female with past history of coronary artery dis Packs/day: 0.00      Years: 0.00      Alcohol use: Yes              Comment: 1 GLASS OF WINE EVERY NIGHT      Review of Systems    Positive for stated complaint: finger lac  Other systems are as noted in HPI. Constitutional and vital signs reviewed. electrocautery and Surgicel. She will see the patient in follow-up next week. After anesthetizing the finger with 1% lidocaine without epinephrine via digital block. The bleeding was controlled with electrocautery.   A piece of Surgicel was placed with detailed exam details…

## 2017-10-18 ENCOUNTER — OFFICE VISIT (OUTPATIENT)
Dept: CARDIOLOGY CLINIC | Facility: HOSPITAL | Age: 82
End: 2017-10-18
Attending: INTERNAL MEDICINE
Payer: MEDICARE

## 2017-10-18 VITALS
OXYGEN SATURATION: 88 % | WEIGHT: 176 LBS | DIASTOLIC BLOOD PRESSURE: 63 MMHG | BODY MASS INDEX: 28 KG/M2 | HEART RATE: 69 BPM | SYSTOLIC BLOOD PRESSURE: 101 MMHG

## 2017-10-18 DIAGNOSIS — I50.9 CHF (CONGESTIVE HEART FAILURE) (HCC): ICD-10-CM

## 2017-10-18 DIAGNOSIS — I48.21 PERMANENT ATRIAL FIBRILLATION (HCC): Primary | ICD-10-CM

## 2017-10-18 DIAGNOSIS — I50.33 ACUTE ON CHRONIC DIASTOLIC CONGESTIVE HEART FAILURE (HCC): ICD-10-CM

## 2017-10-18 PROCEDURE — 93005 ELECTROCARDIOGRAM TRACING: CPT

## 2017-10-18 PROCEDURE — 93010 ELECTROCARDIOGRAM REPORT: CPT | Performed by: NURSE PRACTITIONER

## 2017-10-18 PROCEDURE — 99214 OFFICE O/P EST MOD 30 MIN: CPT | Performed by: NURSE PRACTITIONER

## 2017-10-18 PROCEDURE — 83880 ASSAY OF NATRIURETIC PEPTIDE: CPT | Performed by: NURSE PRACTITIONER

## 2017-10-18 PROCEDURE — 80048 BASIC METABOLIC PNL TOTAL CA: CPT | Performed by: NURSE PRACTITIONER

## 2017-10-18 PROCEDURE — 99211 OFF/OP EST MAY X REQ PHY/QHP: CPT | Performed by: NURSE PRACTITIONER

## 2017-10-18 PROCEDURE — 36415 COLL VENOUS BLD VENIPUNCTURE: CPT | Performed by: NURSE PRACTITIONER

## 2017-10-18 NOTE — PROGRESS NOTES
Heart Failure [address] Patient Status:  Outpatient    1935 MRN [id number]   Location [facility] MD Ana Rosa Garcia Skylar is a 81 yo with CAD, CABG recliner at home due to back pain. No orthopnea. Patient feels breathing is at baseline. Occasional dizziness. Right olecranon inflammation. Was seen by [facility] yesterday and placed on antibiotics for bursitis per PCP. No CP/palpitations/syncope.   López and rhythm  Abdomen: soft, non-tender; bowel sounds normal; no masses,  no organomegaly  Extremities: trace BLE edema  Pulses: 2+ and symmetric  Neurologic: Grossly normal      Education:  Patient and family instructed at length regarding clinic procedures admission) Was on rapamil prior to admission, currently on hold secondary to CKD. Continue to hold and monitor. Encouraged to wear home O2 more frequently and keep O2Sats greater than 90%. Continue current medications.  Follow up with Dr. Galileo Hdz tomorrow

## 2017-10-18 NOTE — PATIENT INSTRUCTIONS
Continue current medications    Please call the heart failure clinic if you notice a weight gain of 3 pounds overnight or 5 pounds or more in 5 days.      Monitor yourself for signs and symptoms of fluid overload, increased shortness of breath, difficulty

## 2017-10-24 ENCOUNTER — TELEPHONE (OUTPATIENT)
Dept: CARDIOLOGY CLINIC | Facility: HOSPITAL | Age: 82
End: 2017-10-24

## 2017-10-24 NOTE — TELEPHONE ENCOUNTER
Received call from Ms. [address] States patient looks dehydrated with dry mucous membranes and has not been taking in enough fluids. Wt stable at 168lb, HR 98 /80s. Creatinine was slightly elevated at last weeks visit.   Will have her

## 2017-10-25 ENCOUNTER — OFFICE VISIT (OUTPATIENT)
Dept: CARDIOLOGY CLINIC | Facility: HOSPITAL | Age: 82
End: 2017-10-25
Attending: INTERNAL MEDICINE
Payer: MEDICARE

## 2017-10-25 VITALS
HEART RATE: 102 BPM | BODY MASS INDEX: 27 KG/M2 | WEIGHT: 168.38 LBS | SYSTOLIC BLOOD PRESSURE: 107 MMHG | DIASTOLIC BLOOD PRESSURE: 67 MMHG | RESPIRATION RATE: 20 BRPM | OXYGEN SATURATION: 100 %

## 2017-10-25 DIAGNOSIS — I50.9 CHF (CONGESTIVE HEART FAILURE) (HCC): ICD-10-CM

## 2017-10-25 DIAGNOSIS — I50.33 ACUTE ON CHRONIC DIASTOLIC CONGESTIVE HEART FAILURE (HCC): Primary | ICD-10-CM

## 2017-10-25 PROCEDURE — 80048 BASIC METABOLIC PNL TOTAL CA: CPT | Performed by: NURSE PRACTITIONER

## 2017-10-25 PROCEDURE — 36415 COLL VENOUS BLD VENIPUNCTURE: CPT | Performed by: NURSE PRACTITIONER

## 2017-10-25 PROCEDURE — 83735 ASSAY OF MAGNESIUM: CPT | Performed by: NURSE PRACTITIONER

## 2017-10-25 PROCEDURE — 99211 OFF/OP EST MAY X REQ PHY/QHP: CPT | Performed by: NURSE PRACTITIONER

## 2017-10-25 PROCEDURE — 99214 OFFICE O/P EST MOD 30 MIN: CPT | Performed by: NURSE PRACTITIONER

## 2017-10-25 NOTE — PATIENT INSTRUCTIONS
Continue current medications at present. I will call you with blood work results later today and inform you of any changes to be made to your medications.      Please call the heart failure clinic if you notice a weight gain of 3 pounds overnight or 5 poun

## 2017-10-25 NOTE — PROGRESS NOTES
Heart Failure [address] Patient Status:  Outpatient    1935 MRN [id number]   Location Marlin Horn is a 79 yo with CAD, CABG, PVD inflammation resolved. Can ambulate in house without difficulty. Has pain in lower extremities which she states feels like her \"bones are going to dissolve\". At times gets fatigue during the day in which she ends up taking naps.   Energy is improving b 08:19 PM       General appearance: alert, appears stated age and cooperative  Neck: no JVD  Lungs:  Lungs clear  Chest wall: no tenderness  Heart: S1, S2 normal, no murmur, click, rub or gallop, regular rate and rhythm  Abdomen: soft, non-tender; bowel amanda current medications Encouraged to wear home O2 more frequently and keep O2 Sats greater than 90%. Follow up in heart failure clinic in 2 weeks. Plan:  Continue current medications.  I will call you with blood work results later today and inform you of

## 2017-11-07 ENCOUNTER — TELEPHONE (OUTPATIENT)
Dept: CARDIOLOGY CLINIC | Facility: HOSPITAL | Age: 82
End: 2017-11-07

## 2017-11-07 NOTE — TELEPHONE ENCOUNTER
Received call from Jagjit Crenshaw [address] RN regarding update on Ms Angelita Mayer. States her heart rate was as high as 136, /64. Wt stable, denies SOB, palpitations, lightheadedness, dizziness.   Patient currently on Metoprolol 100 mg twice daily, Dil

## 2017-11-08 ENCOUNTER — OFFICE VISIT (OUTPATIENT)
Dept: CARDIOLOGY CLINIC | Facility: HOSPITAL | Age: 82
End: 2017-11-08
Attending: INTERNAL MEDICINE
Payer: MEDICARE

## 2017-11-08 VITALS
SYSTOLIC BLOOD PRESSURE: 117 MMHG | HEART RATE: 79 BPM | BODY MASS INDEX: 28 KG/M2 | WEIGHT: 171.88 LBS | DIASTOLIC BLOOD PRESSURE: 78 MMHG

## 2017-11-08 DIAGNOSIS — I50.33 ACUTE ON CHRONIC DIASTOLIC CONGESTIVE HEART FAILURE (HCC): Primary | ICD-10-CM

## 2017-11-08 DIAGNOSIS — J43.2 CENTRILOBULAR EMPHYSEMA (HCC): ICD-10-CM

## 2017-11-08 DIAGNOSIS — I50.9 CHF (CONGESTIVE HEART FAILURE) (HCC): ICD-10-CM

## 2017-11-08 PROCEDURE — 80048 BASIC METABOLIC PNL TOTAL CA: CPT | Performed by: NURSE PRACTITIONER

## 2017-11-08 PROCEDURE — 83880 ASSAY OF NATRIURETIC PEPTIDE: CPT | Performed by: NURSE PRACTITIONER

## 2017-11-08 PROCEDURE — 99214 OFFICE O/P EST MOD 30 MIN: CPT | Performed by: NURSE PRACTITIONER

## 2017-11-08 PROCEDURE — 36415 COLL VENOUS BLD VENIPUNCTURE: CPT | Performed by: NURSE PRACTITIONER

## 2017-11-08 PROCEDURE — 99211 OFF/OP EST MAY X REQ PHY/QHP: CPT | Performed by: NURSE PRACTITIONER

## 2017-11-08 RX ORDER — POTASSIUM CHLORIDE 20 MEQ/1
TABLET, EXTENDED RELEASE ORAL
Status: COMPLETED
Start: 2017-11-08 | End: 2017-11-08

## 2017-11-08 RX ORDER — POTASSIUM CHLORIDE 20 MEQ/1
20 TABLET, EXTENDED RELEASE ORAL DAILY
Qty: 30 TABLET | Refills: 0 | Status: SHIPPED | OUTPATIENT
Start: 2017-11-08

## 2017-11-08 RX ADMIN — POTASSIUM CHLORIDE 40 MEQ: 20 TABLET, EXTENDED RELEASE ORAL at 11:15:00

## 2017-11-08 NOTE — PROGRESS NOTES
Heart Failure [address] Patient Status:  Outpatient    1935 MRN [id number]   Location Lore Caldwell is a 79 yo with CAD, CABG, PVD have humidification with her home O2. Feels hungry but states her food tastes like medicine/metal.  Can ambulate in house without difficulty. At times gets fatigue during the day in which she ends up taking naps.   Energy is improving but still not back t Chest wall: no tenderness  Heart: irregularly irregular rhythm  Abdomen: soft, non-tender; bowel sounds normal; no masses,  no organomegaly  Extremities: no BLE edema  Pulses: 2+ and symmetric  Neurologic: Grossly normal      Education:  Patient and fami Lasix (furosemide) to 40 mg twice daily for 3 days, then Lasix (furosemide) 40 mg daily    K Dur 20 meq daily    Call [employer] company to obtain humidification for O2 machine    Please call the heart failure clinic if you notice a weight gain of 3 pound

## 2017-11-08 NOTE — PATIENT INSTRUCTIONS
Increase Lasix (furosemide) to 40 mg twice daily for 3 days, then Lasix (furosemide) 40 mg daily    K Dur 20 meq daily    Call Res[employer] company to obtain humidification for O2 machine    Please call the heart failure clinic if you notice a weight gain o

## 2017-11-14 RX ORDER — SODIUM CHLORIDE 9 MG/ML
INJECTION, SOLUTION INTRAVENOUS ONCE
Status: DISCONTINUED | OUTPATIENT
Start: 2017-11-15 | End: 2017-11-15

## 2017-11-15 ENCOUNTER — HOSPITAL ENCOUNTER (OUTPATIENT)
Dept: INTERVENTIONAL RADIOLOGY/VASCULAR | Facility: HOSPITAL | Age: 82
Discharge: HOME | End: 2017-11-15
Attending: INTERNAL MEDICINE | Admitting: INTERNAL MEDICINE
Payer: MEDICARE

## 2017-11-15 VITALS
BODY MASS INDEX: 28 KG/M2 | SYSTOLIC BLOOD PRESSURE: 129 MMHG | WEIGHT: 175 LBS | DIASTOLIC BLOOD PRESSURE: 62 MMHG | HEART RATE: 74 BPM | OXYGEN SATURATION: 91 % | RESPIRATION RATE: 30 BRPM

## 2017-11-15 DIAGNOSIS — I48.91 A-FIB (HCC): ICD-10-CM

## 2017-11-15 PROCEDURE — 93005 ELECTROCARDIOGRAM TRACING: CPT

## 2017-11-15 PROCEDURE — 83735 ASSAY OF MAGNESIUM: CPT | Performed by: INTERNAL MEDICINE

## 2017-11-15 PROCEDURE — 93010 ELECTROCARDIOGRAM REPORT: CPT | Performed by: INTERNAL MEDICINE

## 2017-11-15 PROCEDURE — 80048 BASIC METABOLIC PNL TOTAL CA: CPT | Performed by: INTERNAL MEDICINE

## 2017-11-15 PROCEDURE — 5A2204Z RESTORATION OF CARDIAC RHYTHM, SINGLE: ICD-10-PCS | Performed by: INTERNAL MEDICINE

## 2017-11-15 PROCEDURE — 36415 COLL VENOUS BLD VENIPUNCTURE: CPT | Performed by: INTERNAL MEDICINE

## 2017-11-15 PROCEDURE — 92960 CARDIOVERSION ELECTRIC EXT: CPT

## 2017-11-15 RX ORDER — SODIUM CHLORIDE 9 MG/ML
INJECTION, SOLUTION INTRAVENOUS
Status: COMPLETED
Start: 2017-11-15 | End: 2017-11-15

## 2017-11-15 NOTE — PROCEDURES
[facility]    PATIENT'S NAME: Nolan Scott   ATTENDING PHYSICIAN: Siddhartha Wing. Estefanía Vang MD   OPERATING PHYSICIAN: Siddhartha Wing.  Estefanía Vang MD   PATIENT ACCOUNT#:   [id number]    LOCATION:  [place] [address]  MEDICAL RECORD #:   [id number]

## 2017-11-15 NOTE — PROCEDURES
Cardiolgy:  Procedure: Cardioversion  Indication: atrial fibrillation  Diagnosis post procedure: Sinus rhythm  Sedation Brevitol  50  Mg  Patient cardioverted with    200 Joules sync. to sinus rhythm  No complications  Will adjust meds and follow hr bp   S

## 2019-02-28 VITALS
HEIGHT: 65 IN | SYSTOLIC BLOOD PRESSURE: 124 MMHG | HEART RATE: 138 BPM | BODY MASS INDEX: 28.49 KG/M2 | WEIGHT: 171 LBS | DIASTOLIC BLOOD PRESSURE: 62 MMHG | RESPIRATION RATE: 18 BRPM

## 2019-02-28 VITALS
BODY MASS INDEX: 29.16 KG/M2 | OXYGEN SATURATION: 81 % | DIASTOLIC BLOOD PRESSURE: 58 MMHG | SYSTOLIC BLOOD PRESSURE: 102 MMHG | HEART RATE: 106 BPM | WEIGHT: 175 LBS | HEIGHT: 65 IN

## 2019-02-28 VITALS
DIASTOLIC BLOOD PRESSURE: 68 MMHG | WEIGHT: 168 LBS | OXYGEN SATURATION: 86 % | HEART RATE: 137 BPM | BODY MASS INDEX: 27.99 KG/M2 | HEIGHT: 65 IN | SYSTOLIC BLOOD PRESSURE: 130 MMHG

## 2019-02-28 VITALS
SYSTOLIC BLOOD PRESSURE: 118 MMHG | HEIGHT: 65 IN | HEART RATE: 65 BPM | WEIGHT: 168 LBS | DIASTOLIC BLOOD PRESSURE: 62 MMHG | BODY MASS INDEX: 27.99 KG/M2 | OXYGEN SATURATION: 95 %

## 2019-03-01 VITALS
HEART RATE: 70 BPM | SYSTOLIC BLOOD PRESSURE: 136 MMHG | HEIGHT: 67 IN | WEIGHT: 170 LBS | DIASTOLIC BLOOD PRESSURE: 62 MMHG | BODY MASS INDEX: 26.68 KG/M2 | OXYGEN SATURATION: 93 %

## 2019-03-01 VITALS
HEIGHT: 65 IN | HEART RATE: 62 BPM | WEIGHT: 176 LBS | SYSTOLIC BLOOD PRESSURE: 114 MMHG | RESPIRATION RATE: 20 BRPM | DIASTOLIC BLOOD PRESSURE: 60 MMHG | BODY MASS INDEX: 29.32 KG/M2

## 2020-07-29 NOTE — PROGRESS NOTES
[facility]    Progress Note    [address] [facility] Patient Status:  Inpatient    1935 MRN [id number]   Location [facility] 3W/SW Attending Selin Gonzalez MD   Hosp Day # 5 PCP Carin Guzmán       Subjective:     Paulina Flowers Plavix and Asprin   - plan cath 9/25     DVT prophylaxis: heparin gtt-->xarelto- hold for cath  Dispo: pending    Results:       Lab Results  Component Value Date   WBC 5.2 09/22/2017   HGB 12.1 09/22/2017   HCT 37.2 09/22/2017    09/22/2017   CREAT Patent

## (undated) NOTE — LETTER
Hospital Discharge Documentation  Please phone to schedule a hospital follow up appointment.     From: [address] Hospitalist's Office  Phone: [phone]    Patient discharged time/date: 10/1/2017  3:21 PM  Patient discharge disposition:  Home or Self medications. Patient had a cardiac catheterization which showed a complex lesion in the LAD. She was taken back for second cardiac catheterization with PCI to the area after rotational arthrectomy. She did very well and was restarted on her Xarelto.   Sh Refills:  0        CHANGE how you take these medications      Instructions Prescription details   furosemide 40 MG Tabs  Commonly known as:  LASIX  What changed:  · medication strength  · how much to take  · when to take this  · additional instructions Where to Get Your Medications      These medications were sent to [address], [address] [facility]    Phone:  [phone]   · Tiotropium Bromide-Olodaterol 2.5-2 29-58 Medium Risk  0-28   Low Risk.     Risk of readmission:[WW.1] Soheila Jones[WW.2] has High Risk of readmission after discharge from the hospital.[WW.1]          Electronically signed by Denise Hoyt MD on 10/2/2017  2:35 PM   Attribution Key

## (undated) NOTE — LETTER
[address], [place]  Authorization for Invasive Procedures  1.  I hereby authorize Dr. Vale Bardales , my physician and whomever may be designated as the doctor's assistant, to perform the following operation and/or procedure:  Cardiov performed for the purposes of advancing medicine, science, and/or education, provided my identity is not revealed. If the procedure has been videotaped, the physician/surgeon will obtain the original videotape.  The hospital will not be responsible for stor My signature below affirms that prior to the time of the procedure, I have explained to the patient and/or her legal representative, the risks and benefits involved in the proposed treatment and any reasonable alternative to the proposed treatment.  I have